# Patient Record
Sex: FEMALE | Race: OTHER | NOT HISPANIC OR LATINO | Employment: FULL TIME | URBAN - METROPOLITAN AREA
[De-identification: names, ages, dates, MRNs, and addresses within clinical notes are randomized per-mention and may not be internally consistent; named-entity substitution may affect disease eponyms.]

---

## 2021-05-02 ENCOUNTER — HOSPITAL ENCOUNTER (EMERGENCY)
Facility: HOSPITAL | Age: 31
Discharge: HOME | End: 2021-05-02
Attending: EMERGENCY MEDICINE
Payer: COMMERCIAL

## 2021-05-02 VITALS
OXYGEN SATURATION: 99 % | BODY MASS INDEX: 23.99 KG/M2 | TEMPERATURE: 97.9 F | HEIGHT: 65 IN | RESPIRATION RATE: 18 BRPM | DIASTOLIC BLOOD PRESSURE: 60 MMHG | SYSTOLIC BLOOD PRESSURE: 124 MMHG | WEIGHT: 144 LBS | HEART RATE: 55 BPM

## 2021-05-02 DIAGNOSIS — M79.641 BILATERAL HAND PAIN: ICD-10-CM
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
## 2021-05-02 DIAGNOSIS — M79.642 BILATERAL HAND PAIN: ICD-10-CM

## 2021-05-02 DIAGNOSIS — J34.89 NOSE PAIN: ICD-10-CM
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
## 2021-05-02 PROCEDURE — 99282 EMERGENCY DEPT VISIT SF MDM: CPT | Performed by: EMERGENCY MEDICINE

## 2021-05-02 PROCEDURE — 99284 EMERGENCY DEPT VISIT MOD MDM: CPT

## 2021-05-02 RX ORDER — ACETAMINOPHEN 325 MG/1
975 TABLET ORAL ONCE
Status: COMPLETED | OUTPATIENT
Start: 2021-05-02 | End: 2021-05-02

## 2021-05-02 RX ORDER — IBUPROFEN 400 MG/1
400 TABLET ORAL ONCE
Status: COMPLETED | OUTPATIENT
Start: 2021-05-02 | End: 2021-05-02

## 2021-05-02 RX ADMIN — IBUPROFEN 400 MG: 400 TABLET ORAL at 22:48

## 2021-05-02 RX ADMIN — ACETAMINOPHEN 975 MG: 325 TABLET, FILM COATED ORAL at 22:48
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
